# Patient Record
Sex: MALE | Race: WHITE | Employment: UNEMPLOYED | URBAN - METROPOLITAN AREA
[De-identification: names, ages, dates, MRNs, and addresses within clinical notes are randomized per-mention and may not be internally consistent; named-entity substitution may affect disease eponyms.]

---

## 2019-02-20 ENCOUNTER — HOSPITAL ENCOUNTER (EMERGENCY)
Age: 39
Discharge: HOME | End: 2019-02-20
Attending: EMERGENCY MEDICINE
Payer: MEDICAID

## 2019-02-20 VITALS
HEART RATE: 90 BPM | HEIGHT: 69 IN | WEIGHT: 190 LBS | BODY MASS INDEX: 28.14 KG/M2 | SYSTOLIC BLOOD PRESSURE: 130 MMHG | TEMPERATURE: 98 F | RESPIRATION RATE: 18 BRPM | OXYGEN SATURATION: 98 % | DIASTOLIC BLOOD PRESSURE: 89 MMHG

## 2019-02-20 DIAGNOSIS — F10.10 ALCOHOL ABUSE: Primary | ICD-10-CM

## 2019-02-20 PROCEDURE — 99282 EMERGENCY DEPT VISIT SF MDM: CPT

## 2019-02-20 RX ORDER — CHLORDIAZEPOXIDE HYDROCHLORIDE 5 MG/1
CAPSULE, GELATIN COATED ORAL
Qty: 12 CAP | Refills: 0 | OUTPATIENT
Start: 2019-02-20 | End: 2022-08-21

## 2019-02-20 NOTE — ED PROVIDER NOTES
'stopped drinking last night/ been drinking a 'while'; last time I stopped suddenly I started having seizures so my counselor brought me here to get treated'    pt denies HA, vison changes, diff swallowing, CP, SOB, Abd pain, F/Ch, N/V, D/Cons or other current systemic complaints    Pt told' if drinks alcohol on librium it could kill him'/ he has repeated this to me in front of counselor/ agrees w/ plans; The history is provided by the patient and a friend. Headache           No past medical history on file. No past surgical history on file. No family history on file. Social History     Socioeconomic History    Marital status: Not on file     Spouse name: Not on file    Number of children: Not on file    Years of education: Not on file    Highest education level: Not on file   Social Needs    Financial resource strain: Not on file    Food insecurity - worry: Not on file    Food insecurity - inability: Not on file    Transportation needs - medical: Not on file   [employer] needs - non-medical: Not on file   Occupational History    Not on file   Tobacco Use    Smoking status: Not on file   Substance and Sexual Activity    Alcohol use: Not on file    Drug use: Not on file    Sexual activity: Not on file   Other Topics Concern    Not on file   Social History Narrative    Not on file         ALLERGIES: Patient has no allergy information on record. Review of Systems   Neurological: Positive for headaches. All other systems reviewed and are negative. There were no vitals filed for this visit. Physical Exam   Constitutional: He is oriented to person, place, and time. He appears well-developed and well-nourished. No distress. NAD, AxOx4, speaking in complete sentences  gcs = 15    VSS   HENT:   Head: Normocephalic and atraumatic. Mouth/Throat: Oropharynx is clear and moist. No oropharyngeal exudate.    Cn intact       Eyes: Conjunctivae and EOM are normal. Pupils are equal, round, and reactive to light. Right eye exhibits no discharge. Left eye exhibits no discharge. Neck: Normal range of motion. Neck supple. Cardiovascular: Normal rate, regular rhythm, normal heart sounds and intact distal pulses. Exam reveals no gallop and no friction rub. No murmur heard. Pulmonary/Chest: Effort normal and breath sounds normal. No respiratory distress. He has no wheezes. He has no rales. He exhibits no tenderness. Abdominal: Soft. Bowel sounds are normal. He exhibits no distension and no mass. There is no tenderness. There is no rebound and no guarding. nttp     Genitourinary:   Genitourinary Comments: Pt denies urinary/ Testicular/ scrotal or penile  complaints   Musculoskeletal: Normal range of motion. He exhibits no edema, tenderness or deformity. Lymphadenopathy:     He has no cervical adenopathy. Neurological: He is alert and oriented to person, place, and time. He displays normal reflexes. No cranial nerve deficit or sensory deficit. He exhibits normal muscle tone. Coordination normal.   pt has motor/ CV/ Sensation grossly intact to all extremities, R = L in strength;   Skin: Skin is warm and dry. Capillary refill takes less than 2 seconds. No rash noted. No erythema. Psychiatric: He has a normal mood and affect. Denies SI/ HI   Nursing note and vitals reviewed. MDM       Procedures    6:13 PM Pt given LIBRIUM TAPER/ headed to rehab;   Scott Large  results have been reviewed with him. He has been counseled regarding his diagnosis. He verbally conveys understanding and agreement of the signs, symptoms, diagnosis, treatment and prognosis and additionally agrees to Call/ Arrange follow up as recommended with Dr. None in 24 - 48 hours. He also agrees with the care-plan and conveys that all of his questions have been answered.   I have also put together some discharge instructions for him that include: 1) educational information regarding their diagnosis, 2) how to care for their diagnosis at home, as well a 3) list of reasons why they would want to return to the ED prior to their follow-up appointment, should their condition change or for concerns.

## 2019-02-20 NOTE — ED TRIAGE NOTES
TRIAGE NOTE: Patient arrived from home with c/o \"medical clearance\" Patient is currently in a \"mental health\" program. Patient is a every day drinker. Last drink was yesterday midnight of 2L of wine.  Hx of dennys malone detoxing

## 2019-02-20 NOTE — DISCHARGE INSTRUCTIONS
Patient Education        Learning About Alcohol Misuse  What is alcohol misuse? Alcohol misuse means drinking so much that it causes problems for you or others. Early problems with alcohol can start at home. You may argue with loved ones about how much you're drinking. Your job may be affected because of drinking. You may drink when it's dangerous or illegal, such as when you drive. Drinking too much for a long time can lead to health conditions like high blood pressure and liver problems. What are the symptoms? Symptoms of alcohol misuse may include:  · Drinking much more than you planned. · Drinking even though it's causing problems for you or others. · Putting yourself in situations where you might get hurt. · Wanting to cut down or stop drinking, but not being able to. · Feeling guilty about how much you're drinking. How is alcohol misuse treated? Getting help for problems with alcohol is up to you. But you don't have to do it alone. There are many people and kinds of treatments to help with alcohol problems. Talking to your doctor is the first step. When you get a doctor's help, treatment for alcohol problems can be safer and quicker. Treatment options can include:  · Treatment programs. Examples are group therapy, one or more types of counseling, and alcohol education. · Medicines. A doctor or counselor can help you know what kinds of medicines might help with cravings. · Free social support groups. These groups include AA (Alcoholics Anonymous) and SMART (Self-Management and Recovery Training). Your doctor can help you decide which type of program is best for you. Follow-up care is a key part of your treatment and safety. Be sure to make and go to all appointments, and call your doctor if you are having problems. It's also a good idea to know your test results and keep a list of the medicines you take. Where can you learn more? Go to http://lauren-kristan.info/.   Enter H758 in the search box to learn more about \"Learning About Alcohol Misuse. \"  Current as of: May 7, 2018  Content Version: 11.9  © 7191-7585 [employer]. Care instructions adapted under license by [employer] (which disclaims liability or warranty for this information). If you have questions about a medical condition or this instruction, always ask your healthcare professional. [place] [address] any warranty or liability for your use of this information.

## 2021-05-20 ENCOUNTER — OFFICE VISIT (OUTPATIENT)
Dept: INTERNAL MEDICINE CLINIC | Age: 41
End: 2021-05-20
Payer: MEDICAID

## 2021-05-20 VITALS
DIASTOLIC BLOOD PRESSURE: 81 MMHG | HEART RATE: 89 BPM | WEIGHT: 201.9 LBS | BODY MASS INDEX: 29.9 KG/M2 | TEMPERATURE: 98.1 F | OXYGEN SATURATION: 97 % | SYSTOLIC BLOOD PRESSURE: 121 MMHG | HEIGHT: 69 IN | RESPIRATION RATE: 16 BRPM

## 2021-05-20 DIAGNOSIS — E66.3 OVERWEIGHT (BMI 25.0-29.9): ICD-10-CM

## 2021-05-20 DIAGNOSIS — F19.20 DRUG ABUSE AND DEPENDENCE (HCC): ICD-10-CM

## 2021-05-20 DIAGNOSIS — Z00.00 PHYSICAL EXAM: Primary | ICD-10-CM

## 2021-05-20 DIAGNOSIS — B17.10 ACUTE HEPATITIS C VIRUS INFECTION WITHOUT HEPATIC COMA: ICD-10-CM

## 2021-05-20 DIAGNOSIS — Z72.0 TOBACCO ABUSE: ICD-10-CM

## 2021-05-20 PROCEDURE — 99385 PREV VISIT NEW AGE 18-39: CPT | Performed by: INTERNAL MEDICINE

## 2021-05-20 PROCEDURE — 99214 OFFICE O/P EST MOD 30 MIN: CPT | Performed by: INTERNAL MEDICINE

## 2021-05-20 RX ORDER — BUPRENORPHINE HYDROCHLORIDE, NALOXONE HYDROCHLORIDE 8; 2 MG/1; MG/1
FILM, SOLUBLE BUCCAL; SUBLINGUAL
COMMUNITY
Start: 2021-05-07

## 2021-05-20 NOTE — PROGRESS NOTES
Chief Complaint   Patient presents with    New Patient     history of hepatitis C, IV drug use (clean since 2017)         1. Have you been to the ER, urgent care clinic since your last visit? Hospitalized since your last visit? No    2. Have you seen or consulted any other health care providers outside of the [address] [place] [address] since your last visit? Include any pap smears or colon screening.  No

## 2021-05-20 NOTE — PROGRESS NOTES
[facility]  [place] [address]  [address] [place] [address]  Phone:  [phone]  Fax: [phone]       Chief Complaint   Patient presents with    New Patient     history of hepatitis C, IV drug use (clean since 2017)   . SUBJECTIVE:    Tutu Albarran is a 47 y.o. male comes in for physical examination. He has a history of criminal drug abuse. He is now on Suboxone. He was told that he had a hepatitis C infection and wishes to follow up on this. He has no other complaints. He is accompanied by a . Current Outpatient Medications   Medication Sig Dispense Refill    Suboxone 8-2 mg film sublingaul film       chlordiazePOXIDE (LIBRIUM) 5 mg capsule Take 1 tab PO TID x 2 days then  Take 1 tab PO BID x 2 days then  Take 1 tab PO qday x 2 days then stop (Patient not taking: Reported on 5/20/2021) 12 Cap 0     Past Medical History:   Diagnosis Date    Hepatitis C      History reviewed. No pertinent surgical history.   No Known Allergies      REVIEW OF SYSTEMS:  General: negative for - chills or fever  ENT: negative for - headaches, nasal congestion or tinnitus  Respiratory: negative for - cough, hemoptysis, shortness of breath or wheezing  Cardiovascular : negative for - chest pain, edema, palpitations or shortness of breath  Gastrointestinal: negative for - abdominal pain, blood in stools, heartburn or nausea/vomiting  Genito-Urinary: no dysuria, trouble voiding, or hematuria  Musculoskeletal: negative for - gait disturbance, joint pain, joint stiffness or joint swelling  Neurological: no TIA or stroke symptoms  Hematologic: no bruises, no bleeding, no swollen glands  Integument: no lumps, mole changes, nail changes or rash  Endocrine: no malaise/lethargy or unexpected weight changes      Social History     Socioeconomic History    Marital status: SINGLE     Spouse name: Not on file    Number of children: Not on file    Years of education: Not on file    Highest education level: Not on file   Tobacco Use    Smoking status: Current Every Day Smoker    Smokeless tobacco: Never Used   Vaping Use    Vaping Use: Never used   Substance and Sexual Activity    Alcohol use: Yes    Drug use: Not Currently     Types: IV    Sexual activity: Yes     Partners: Female     Birth control/protection: Condom     Social Determinants of Health     Financial Resource Strain:     Difficulty of Paying Living Expenses:    Food Insecurity:     Worried About Running Out of Food in the Last Year:     [address] in the Last Year:    Transportation Needs:     Lack of Transportation (Medical):  Lack of Transportation (Non-Medical):    Physical Activity:     Days of Exercise per Week:     Minutes of Exercise per Session:    Stress:     Feeling of Stress :    Social Connections:     Frequency of Communication with Friends and Family:     Frequency of Social Gatherings with Friends and Family:     Attends L[employer] Services:     Active Member of Clubs or Organizations:     Attends Club or Organization Meetings:     Marital Status:      History reviewed. No pertinent family history. OBJECTIVE:    Visit Vitals  /81   Pulse 89   Temp 98.1 °F (36.7 °C) (Oral)   Resp 16   Ht 5' 9\" (1.753 m)   Wt 201 lb 14.4 oz (91.6 kg)   SpO2 97%   BMI 29.82 kg/m²     CONSTITUTIONAL: well , well nourished, appears age appropriate  EYES: perrla, eom intact  ENMT:moist mucous membranes, pharynx clear  NECK: supple. Thyroid normal  RESPIRATORY: Chest: clear to ascultation and percussion   CARDIOVASCULAR: Heart: regular rate and rhythm  GASTROINTESTINAL: Abdomen: soft, bowel sounds active  HEMATOLOGIC: no pathological lymph nodes palpated  MUSCULOSKELETAL: Extremities: no edema, pulse 1+   INTEGUMENT: No unusual rashes or suspicious skin lesions noted. Nails appear normal.  NEUROLOGIC: non-focal exam   MENTAL STATUS: alert and oriented, appropriate affect      ASSESSMENT:  1. Physical exam    2. Acute hepatitis C virus infection without hepatic coma    3. Overweight (BMI 25.0-29.9)    4. Tobacco abuse    5. Drug abuse and dependence ([facility] [address]        PLAN:  1. The patient has a history of femoral drug abuse. He was told that he had hepatitis C and this will be confirmed and he will subsequently be referred to a hepatologist.  2. He needs to discontinue cigarette smoking, but this is pretty much low in the list given the prior use of his drug abuse and hepatitis C.  3. He is currently in Suboxone therapy. .  Orders Placed This Encounter    HEPATITIS PANEL, ACUTE    HIV 1/2 AG/AB, 4TH GENERATION,W RFLX CONFIRM    RPR    CBC WITH AUTOMATED DIFF    LIPID PANEL    METABOLIC PANEL, COMPREHENSIVE    URINALYSIS W/ RFLX MICROSCOPIC    Suboxone 8-2 mg film sublingaul film         Follow-up and Dispositions    · Return if symptoms worsen or fail to improve.            Evan Smalls MD

## 2021-05-21 LAB
ALBUMIN SERPL-MCNC: 3.9 G/DL (ref 3.5–5)
ALBUMIN/GLOB SERPL: 1.1 {RATIO} (ref 1.1–2.2)
ALP SERPL-CCNC: 109 U/L (ref 45–117)
ALT SERPL-CCNC: 370 U/L (ref 12–78)
ANION GAP SERPL CALC-SCNC: 8 MMOL/L (ref 5–15)
APPEARANCE UR: CLEAR
AST SERPL-CCNC: 275 U/L (ref 15–37)
BASOPHILS # BLD: 0.1 K/UL (ref 0–0.1)
BASOPHILS NFR BLD: 1 % (ref 0–1)
BILIRUB SERPL-MCNC: 0.7 MG/DL (ref 0.2–1)
BILIRUB UR QL: NEGATIVE
BUN SERPL-MCNC: 9 MG/DL (ref 6–20)
BUN/CREAT SERPL: 13 (ref 12–20)
CALCIUM SERPL-MCNC: 8.9 MG/DL (ref 8.5–10.1)
CHLORIDE SERPL-SCNC: 105 MMOL/L (ref 97–108)
CHOLEST SERPL-MCNC: 162 MG/DL
CO2 SERPL-SCNC: 27 MMOL/L (ref 21–32)
COLOR UR: ABNORMAL
CREAT SERPL-MCNC: 0.7 MG/DL (ref 0.7–1.3)
DIFFERENTIAL METHOD BLD: ABNORMAL
EOSINOPHIL # BLD: 0.2 K/UL (ref 0–0.4)
EOSINOPHIL NFR BLD: 4 % (ref 0–7)
ERYTHROCYTE [DISTWIDTH] IN BLOOD BY AUTOMATED COUNT: 12.7 % (ref 11.5–14.5)
GLOBULIN SER CALC-MCNC: 3.4 G/DL (ref 2–4)
GLUCOSE SERPL-MCNC: 91 MG/DL (ref 65–100)
GLUCOSE UR STRIP.AUTO-MCNC: NEGATIVE MG/DL
HAV IGM SER QL: NONREACTIVE
HBV CORE IGM SER QL: NONREACTIVE
HBV SURFACE AG SER QL: 0.4 INDEX
HBV SURFACE AG SER QL: NEGATIVE
HCT VFR BLD AUTO: 50.4 % (ref 36.6–50.3)
HCV AB SER IA-ACNC: >11 INDEX
HCV AB SERPL QL IA: REACTIVE
HCV COMMENT,HCGAC: ABNORMAL
HDLC SERPL-MCNC: 43 MG/DL
HDLC SERPL: 3.8 {RATIO} (ref 0–5)
HGB BLD-MCNC: 16.5 G/DL (ref 12.1–17)
HGB UR QL STRIP: NEGATIVE
HIV 1+2 AB+HIV1 P24 AG SERPL QL IA: NONREACTIVE
HIV12 RESULT COMMENT, HHIVC: NORMAL
IMM GRANULOCYTES # BLD AUTO: 0 K/UL (ref 0–0.04)
IMM GRANULOCYTES NFR BLD AUTO: 0 % (ref 0–0.5)
KETONES UR QL STRIP.AUTO: NEGATIVE MG/DL
LDLC SERPL CALC-MCNC: 92 MG/DL (ref 0–100)
LEUKOCYTE ESTERASE UR QL STRIP.AUTO: NEGATIVE
LYMPHOCYTES # BLD: 1.4 K/UL (ref 0.8–3.5)
LYMPHOCYTES NFR BLD: 27 % (ref 12–49)
MCH RBC QN AUTO: 32.4 PG (ref 26–34)
MCHC RBC AUTO-ENTMCNC: 32.7 G/DL (ref 30–36.5)
MCV RBC AUTO: 98.8 FL (ref 80–99)
MONOCYTES # BLD: 0.6 K/UL (ref 0–1)
MONOCYTES NFR BLD: 11 % (ref 5–13)
NEUTS SEG # BLD: 3 K/UL (ref 1.8–8)
NEUTS SEG NFR BLD: 57 % (ref 32–75)
NITRITE UR QL STRIP.AUTO: NEGATIVE
NRBC # BLD: 0 K/UL (ref 0–0.01)
NRBC BLD-RTO: 0 PER 100 WBC
PH UR STRIP: >8.5 [PH] (ref 5–8)
PLATELET # BLD AUTO: 161 K/UL (ref 150–400)
PMV BLD AUTO: 10.3 FL (ref 8.9–12.9)
POTASSIUM SERPL-SCNC: 4.4 MMOL/L (ref 3.5–5.1)
PROT SERPL-MCNC: 7.3 G/DL (ref 6.4–8.2)
PROT UR STRIP-MCNC: NEGATIVE MG/DL
RBC # BLD AUTO: 5.1 M/UL (ref 4.1–5.7)
RPR SER QL: NONREACTIVE
SODIUM SERPL-SCNC: 140 MMOL/L (ref 136–145)
SP GR UR REFRACTOMETRY: 1.01 (ref 1–1.03)
SP1: ABNORMAL
SP2: ABNORMAL
SP3: ABNORMAL
TRIGL SERPL-MCNC: 135 MG/DL (ref ?–150)
UROBILINOGEN UR QL STRIP.AUTO: 0.2 EU/DL (ref 0.2–1)
VLDLC SERPL CALC-MCNC: 27 MG/DL
WBC # BLD AUTO: 5.2 K/UL (ref 4.1–11.1)

## 2021-05-23 DIAGNOSIS — B17.10 ACUTE HEPATITIS C VIRUS INFECTION WITHOUT HEPATIC COMA: Primary | ICD-10-CM

## 2021-05-23 PROBLEM — Z72.0 TOBACCO ABUSE: Status: ACTIVE | Noted: 2021-05-23

## 2021-05-23 PROBLEM — F19.20 DRUG ABUSE AND DEPENDENCE (HCC): Status: ACTIVE | Noted: 2021-05-23

## 2021-05-23 PROBLEM — E66.3 OVERWEIGHT (BMI 25.0-29.9): Status: ACTIVE | Noted: 2021-05-23

## 2021-06-15 PROBLEM — B18.2 CHRONIC HEPATITIS C WITHOUT HEPATIC COMA (HCC): Status: ACTIVE | Noted: 2021-06-15

## 2022-01-03 ENCOUNTER — TELEPHONE (OUTPATIENT)
Dept: INTERNAL MEDICINE CLINIC | Age: 42
End: 2022-01-03

## 2022-01-03 NOTE — TELEPHONE ENCOUNTER
----- Message from [employer] sent at 12/30/2021  3:10 PM EST -----  Subject: Message to Provider    QUESTIONS  Information for Provider? MAHSA Dominguez Hardy with [address], is   calling to check on the status of paperwork for the pt that was supposed   to be sent over to them a few days ago. Third party states that they   haven't received any paperwork.   ---------------------------------------------------------------------------  --------------  Catalina GOETZ  What is the best way for the office to contact you? OK to leave message on   voicemail  Preferred Call Back Phone Number? [phone]  ---------------------------------------------------------------------------  --------------  SCRIPT ANSWERS  Relationship to Patient? Third Party  Representative Name?  Martha Whipple

## 2022-01-30 ENCOUNTER — HOSPITAL ENCOUNTER (EMERGENCY)
Age: 42
Discharge: HOME | End: 2022-01-30
Attending: EMERGENCY MEDICINE
Payer: MEDICAID

## 2022-01-30 VITALS
SYSTOLIC BLOOD PRESSURE: 158 MMHG | TEMPERATURE: 97.9 F | WEIGHT: 201 LBS | DIASTOLIC BLOOD PRESSURE: 100 MMHG | HEIGHT: 69 IN | HEART RATE: 108 BPM | RESPIRATION RATE: 18 BRPM | BODY MASS INDEX: 29.77 KG/M2 | OXYGEN SATURATION: 98 %

## 2022-01-30 DIAGNOSIS — H61.22 IMPACTED CERUMEN OF LEFT EAR: Primary | ICD-10-CM

## 2022-01-30 DIAGNOSIS — F10.10 ALCOHOL ABUSE: ICD-10-CM

## 2022-01-30 PROCEDURE — 99284 EMERGENCY DEPT VISIT MOD MDM: CPT

## 2022-01-30 NOTE — Clinical Note
[address]  [facility] EMERGENCY DEPT  [address] [facility] [address] [phone]    Work/School Note    Date: 1/30/2022    To Whom It May concern:    Maddi Gipson was seen and treated today in the emergency room by the following provider(s):  Attending Provider: Ekta Chapin MD.      Maddi Gipson is excused from work/school on 01/30/22 and 01/31/22. He is medically clear to return to work/school on 2/1/2022.        Sincerely,          Carlitos Macario

## 2022-01-30 NOTE — ED TRIAGE NOTES
Pt presents with multiple complaints. Pt reports \"water\" stuck in left ear x 1 week. Pt als reports bilateral lower eye swelling.  Pt reports he vomited this AM. Pt reports his job is requesting a  COVID test

## 2022-01-30 NOTE — Clinical Note
[facility] EMERGENCY DEPT  [address] [facility] [address] [phone]    Work/School Note    Date: 1/30/2022    To Whom It May concern:    Armand Kelley was seen and treated today in the emergency room by the following provider(s):  Attending Provider: Ella Rinaldi MD.      Armand Kelley is excused from work/school on 01/30/22 and 01/31/22. He is medically clear to return to work/school on 2/1/2022.        Sincerely,          Tobias Kessler

## 2022-01-30 NOTE — Clinical Note
[facility] EMERGENCY DEPT  [address] [facility] [address] [phone]    Work/School Note    Date: 1/30/2022    To Whom It May concern:    Sanders Koyanagi was seen and treated today in the emergency room by the following provider(s):  Attending Provider: Abdulkadir Christensen MD.      Sanders Koyanagi is excused from work/school on 01/30/22 and 01/31/22. He is medically clear to return to work/school on 2/1/2022.        Sincerely,          Mallika Pan RN

## 2022-01-30 NOTE — ED NOTES
Pt presents to ED ambulatory complaining of feeling of water in his left ear. Pt also states he has a bulb syringe at home with peroxide and will clean his ear out at home. Pt also states he just had blood work done and does not wish to have blood drawn. Pt is alert and oriented x 4, RR even and unlabored, skin is warm and dry. Assessment completed and pt updated on plan of care. Call bell in reach. Emergency Department Nursing Plan of Care       The Nursing Plan of Care is developed from the Nursing assessment and Emergency Department Attending provider initial evaluation. The plan of care may be reviewed in the ED Provider note.     The Plan of Care was developed with the following considerations:   Patient / Family readiness to learn indicated by:verbalized understanding  Persons(s) to be included in education: patient  Barriers to Learning/Limitations:No    Signed     Claudene Libman, RN    1/30/2022   3:01 PM

## 2022-01-30 NOTE — ED PROVIDER NOTES
EMERGENCY DEPARTMENT HISTORY AND PHYSICAL EXAM      Date: 1/30/2022  Patient Name: Derrek Brown    History of Presenting Illness     Chief Complaint   Patient presents with    Ear Pain    Vomiting       History Provided By: Patient    HPI: Derrek Brown, 46 y.o. male with PMHx of Hep C and polysubstance abuse presents to the ED with cc of several complaints. Pt states he has the sensation of water in his L ear which is causing discomfort. Denies ear drainage or tinnitus. C/o n/v x1 this morning while brushing his teeth, no episodes since. Patient is concerned with periorbital swelling that onset this morning, no known contact with food or drug allergies. States he drinks etoh daily, last consumption was yesterday night. Denies SOB or CP. There are no other complaints, changes, or physical findings at this time. PCP: Rasta Raman MD    No current facility-administered medications on file prior to encounter. Current Outpatient Medications on File Prior to Encounter   Medication Sig Dispense Refill    Suboxone 8-2 mg film sublingaul film       chlordiazePOXIDE (LIBRIUM) 5 mg capsule Take 1 tab PO TID x 2 days then  Take 1 tab PO BID x 2 days then  Take 1 tab PO qday x 2 days then stop (Patient not taking: Reported on 5/20/2021) 12 Cap 0       Past History     Past Medical History:  No past medical history on file. Past Surgical History:  No past surgical history on file. Family History:  No family history on file. Social History:  Social History     Tobacco Use    Smoking status: Current Every Day Smoker    Smokeless tobacco: Never Used   Vaping Use    Vaping Use: Never used   Substance Use Topics    Alcohol use: Yes    Drug use: Not Currently     Types: IV       Allergies:  No Known Allergies      Review of Systems   Review of Systems   Constitutional: Negative for chills, fatigue and fever. HENT: Positive for ear pain and sinus pressure. Negative for sore throat.     Eyes: Negative. Respiratory: Negative for cough and shortness of breath. Cardiovascular: Negative for chest pain and palpitations. Gastrointestinal: Positive for nausea and vomiting. Negative for abdominal pain. Genitourinary: Negative for dysuria. Musculoskeletal: Negative. Skin: Negative. Neurological: Negative for dizziness, weakness, light-headedness and headaches. Psychiatric/Behavioral: Negative. All other systems reviewed and are negative. Physical Exam   Physical Exam  Vitals and nursing note reviewed. Exam conducted with a chaperone present. Constitutional:       Appearance: Normal appearance. He is not toxic-appearing. HENT:      Head: Normocephalic and atraumatic. Left Ear: No swelling or tenderness. Ears:      Comments: Cerumen in the left external auditory canal.     Mouth/Throat:      Mouth: Mucous membranes are moist.   Eyes:      Extraocular Movements: Extraocular movements intact. Pupils: Pupils are equal, round, and reactive to light. Cardiovascular:      Rate and Rhythm: Normal rate and regular rhythm. Pulmonary:      Effort: Pulmonary effort is normal. No respiratory distress. Breath sounds: Normal breath sounds. No wheezing, rhonchi or rales. Abdominal:      General: Abdomen is flat. There is no distension. Palpations: Abdomen is soft. Tenderness: There is no abdominal tenderness. Hernia: No hernia is present. Musculoskeletal:         General: No swelling or tenderness. Normal range of motion. Skin:     General: Skin is warm and dry. Neurological:      General: No focal deficit present. Mental Status: He is alert and oriented to person, place, and time. Cranial Nerves: No cranial nerve deficit. Sensory: No sensory deficit.    Psychiatric:         Mood and Affect: Mood normal.         Behavior: Behavior normal.         Diagnostic Study Results     Labs -   No results found for this or any previous visit (from the past 12 hour(s)). Radiologic Studies -   No orders to display     CT Results  (Last 48 hours)    None        CXR Results  (Last 48 hours)    None          Medical Decision Making   I am the first provider for this patient. I reviewed the vital signs, available nursing notes, past medical history, past surgical history, family history and social history. Vital Signs-Reviewed the patient's vital signs. Patient Vitals for the past 12 hrs:   Temp Pulse Resp BP SpO2   01/30/22 1442 97.9 °F (36.6 °C) (!) 122 18 (!) 162/98 97 %           Records Reviewed: Nursing Notes    Provider Notes (Medical Decision Making):   Substance abuse, cerumen impaction    ED Course:   Initial assessment performed. The patients presenting problems have been discussed, and they are in agreement with the care plan formulated and outlined with them. I have encouraged them to ask questions as they arise throughout their visit. Critical Care Time:   none      Disposition:  DISCHARGE  3:00 PM  The patient has been re-evaluated and is ready for discharge. Reviewed available results with patient. Counseled pt on diagnosis and care plan. Pt has expressed understanding, and all questions have been answered. Pt agrees with plan and agrees to follow up as recommended, or return to the ED if their symptoms worsen. Discharge instructions have been provided and explained to the pt, along with reasons to return to the ED. DISCHARGE PLAN:  1. Current Discharge Medication List        2. Follow-up Information    None       3. Return to ED if worse     Diagnosis     Clinical Impression: No diagnosis found. Attestations: This note is prepared by Adalberto Mcgowan, acting as Scribe for Dennis Martinez MD.     Dennis Martinez MD. The scribe's documentation has been prepared under my direction and personally reviewed by me in its entirety.  I confirm that the note above accurately reflects all work, treatment, procedures and medical decision making performed by me.

## 2022-03-09 ENCOUNTER — OFFICE VISIT (OUTPATIENT)
Dept: INTERNAL MEDICINE CLINIC | Age: 42
End: 2022-03-09
Payer: MEDICAID

## 2022-03-09 VITALS
SYSTOLIC BLOOD PRESSURE: 137 MMHG | HEIGHT: 69 IN | TEMPERATURE: 98.5 F | OXYGEN SATURATION: 94 % | WEIGHT: 202.2 LBS | BODY MASS INDEX: 29.95 KG/M2 | RESPIRATION RATE: 18 BRPM | HEART RATE: 76 BPM | DIASTOLIC BLOOD PRESSURE: 89 MMHG

## 2022-03-09 DIAGNOSIS — B18.2 CHRONIC HEPATITIS C WITHOUT HEPATIC COMA (HCC): ICD-10-CM

## 2022-03-09 DIAGNOSIS — E66.3 OVERWEIGHT (BMI 25.0-29.9): ICD-10-CM

## 2022-03-09 DIAGNOSIS — G47.00 INSOMNIA, UNSPECIFIED TYPE: Primary | ICD-10-CM

## 2022-03-09 DIAGNOSIS — R03.0 WHITE COAT SYNDROME WITH HIGH BLOOD PRESSURE WITHOUT HYPERTENSION: ICD-10-CM

## 2022-03-09 DIAGNOSIS — F19.20 DRUG ABUSE AND DEPENDENCE (HCC): ICD-10-CM

## 2022-03-09 PROCEDURE — 99214 OFFICE O/P EST MOD 30 MIN: CPT | Performed by: INTERNAL MEDICINE

## 2022-03-09 RX ORDER — ZOLPIDEM TARTRATE 5 MG/1
5 TABLET ORAL
Qty: 10 TABLET | Refills: 0 | Status: SHIPPED | OUTPATIENT
Start: 2022-03-09

## 2022-03-09 RX ORDER — VELPATASVIR AND SOFOSBUVIR 100; 400 MG/1; MG/1
TABLET, FILM COATED ORAL
COMMUNITY
Start: 2022-01-26

## 2022-03-09 NOTE — PROGRESS NOTES
Chief Complaint   Patient presents with    Insomnia    Hypertension    Leg Pain     bilateral in thigh region     1. Have you been to the ER, urgent care clinic since your last visit? Hospitalized since your last visit? No    2. Have you seen or consulted any other health care providers outside of the [address] [place] [address] since your last visit? Include any pap smears or colon screening.  No  Visit Vitals  /89   Pulse 76   Temp 98.5 °F (36.9 °C) (Oral)   Resp 18   Ht 5' 9\" (1.753 m)   Wt 202 lb 3.2 oz (91.7 kg)   SpO2 94%   BMI 29.86 kg/m²

## 2022-03-13 PROBLEM — R03.0 WHITE COAT SYNDROME WITH HIGH BLOOD PRESSURE WITHOUT HYPERTENSION: Status: ACTIVE | Noted: 2022-03-13

## 2022-03-13 PROBLEM — G47.00 INSOMNIA: Status: ACTIVE | Noted: 2022-03-13

## 2022-03-13 NOTE — PROGRESS NOTES
"Chief Complaint   Patient presents with     Recheck Medication     ADHD       Initial /66 (BP Location: Left arm, Patient Position: Sitting, Cuff Size: Adult Regular)  Pulse 94  Temp 98.3  F (36.8  C) (Tympanic)  Ht 5' 4.75\" (1.645 m)  Wt 137 lb (62.1 kg)  BMI 22.97 kg/m2 Estimated body mass index is 22.97 kg/(m^2) as calculated from the following:    Height as of this encounter: 5' 4.75\" (1.645 m).    Weight as of this encounter: 137 lb (62.1 kg).  Medication Reconciliation: complete     REEMA HOROWITZ      " [address] [facility]  [place] [address]  [address] [address] [facility] [address] [address]  Phone:  [phone]  Fax: [phone]       Chief Complaint   Patient presents with    Insomnia    Hypertension    Leg Pain     bilateral in thigh region   . SUBJECTIVE:    Dre Ng is a 46 y.o. male comes in for return visit complaining of insomnia. This is a chronic ongoing problem. He has taken multiple preparations in the past and for most of them he has build up tolerance. He feels that if he an get sleep he will get much more functional.    He does have a history of sociopathic behavior manifesting itself as drug abuse. He is currently taking Suboxone. He moved from [place] approximately a year ago. His blood pressure was slightly elevated on one occasion. He is gainfully employed currently. Finally, he is taking medication for his hepatitis C. Current Outpatient Medications   Medication Sig Dispense Refill    sofosbuvir-velpatasvir (EPCLUSA) 400-100 mg tablet       zolpidem (AMBIEN) 5 mg tablet Take 1 Tablet by mouth nightly as needed for Sleep. Max Daily Amount: 5 mg. 10 Tablet 0    Suboxone 8-2 mg film sublingaul film       chlordiazePOXIDE (LIBRIUM) 5 mg capsule Take 1 tab PO TID x 2 days then  Take 1 tab PO BID x 2 days then  Take 1 tab PO qday x 2 days then stop (Patient not taking: Reported on 5/20/2021) 12 Cap 0     History reviewed. No pertinent past medical history. History reviewed. No pertinent surgical history.   No Known Allergies      REVIEW OF SYSTEMS:  General: negative for - chills or fever  ENT: negative for - headaches, nasal congestion or tinnitus  Respiratory: negative for - cough, hemoptysis, shortness of breath or wheezing  Cardiovascular : negative for - chest pain, edema, palpitations or shortness of breath  Gastrointestinal: negative for - abdominal pain, blood in stools, heartburn or nausea/vomiting  Genito-Urinary: no dysuria, trouble voiding, or hematuria  Musculoskeletal: negative for - gait disturbance, joint pain, joint stiffness or joint swelling  Neurological: no TIA or stroke symptoms  Hematologic: no bruises, no bleeding, no swollen glands  Integument: no lumps, mole changes, nail changes or rash  Endocrine: no malaise/lethargy or unexpected weight changes      Social History     Socioeconomic History    Marital status: SINGLE   Tobacco Use    Smoking status: Current Every Day Smoker     Packs/day: 0.50    Smokeless tobacco: Never Used   Vaping Use    Vaping Use: Never used   Substance and Sexual Activity    Alcohol use: Yes     Comment: soc    Drug use: Not Currently     Types: IV    Sexual activity: Yes     Partners: Female     Birth control/protection: Condom     History reviewed. No pertinent family history. OBJECTIVE:    Visit Vitals  /89   Pulse 76   Temp 98.5 °F (36.9 °C) (Oral)   Resp 18   Ht 5' 9\" (1.753 m)   Wt 202 lb 3.2 oz (91.7 kg)   SpO2 94%   BMI 29.86 kg/m²     CONSTITUTIONAL: well , well nourished, appears age appropriate  EYES: perrla, eom intact  ENMT:moist mucous membranes, pharynx clear  NECK: supple. Thyroid normal  RESPIRATORY: Chest: clear to ascultation and percussion   CARDIOVASCULAR: Heart: regular rate and rhythm  GASTROINTESTINAL: Abdomen: soft, bowel sounds active  HEMATOLOGIC: no pathological lymph nodes palpated  MUSCULOSKELETAL: Extremities: no edema, pulse 1+   INTEGUMENT: No unusual rashes or suspicious skin lesions noted. Nails appear normal.  NEUROLOGIC: non-focal exam   MENTAL STATUS: alert and oriented, appropriate affect      ASSESSMENT:  1. Insomnia, unspecified type    2. Chronic hepatitis C without hepatic coma ([address]    3. Drug abuse and dependence ([facility] [address]    4. Overweight (BMI 25.0-29.9)    5. White coat syndrome with high blood pressure without hypertension        PLAN:  1. The patient has chronic insomnia. I am going to give him a short course of Ambien.   Unfortunately, there is not much that can be done for this individual because of the tolerance that he has to all the hypnotics for the most part. He has to follow with psychiatry for this. 2. He is to follow with his gastroenterologist regarding his hepatitis C.  3. He remains on the Suboxone for his drug abuse. 4. I suggest minimizing weight gain, which can be accomplished by eating meals, eliminating snacks, and avoiding the consumption of processed carbohydrates. Unfortunately, this is not a high priority for the patient as would be expected. 5. Blood pressure is entirely normal today. .  Orders Placed This Encounter    sofosbuvir-velpatasvir (EPCLUSA) 400-100 mg tablet    zolpidem (AMBIEN) 5 mg tablet         Follow-up and Dispositions    · Return if symptoms worsen or fail to improve.            Lenore Pitts MD

## 2022-07-15 ENCOUNTER — HOSPITAL ENCOUNTER (EMERGENCY)
Age: 42
Discharge: HOME | End: 2022-07-15
Attending: STUDENT IN AN ORGANIZED HEALTH CARE EDUCATION/TRAINING PROGRAM
Payer: MEDICAID

## 2022-07-15 VITALS
DIASTOLIC BLOOD PRESSURE: 79 MMHG | BODY MASS INDEX: 30.36 KG/M2 | SYSTOLIC BLOOD PRESSURE: 124 MMHG | HEIGHT: 69 IN | HEART RATE: 101 BPM | OXYGEN SATURATION: 96 % | TEMPERATURE: 98.1 F | RESPIRATION RATE: 18 BRPM | WEIGHT: 205 LBS

## 2022-07-15 DIAGNOSIS — M54.50 ACUTE BILATERAL LOW BACK PAIN WITHOUT SCIATICA: Primary | ICD-10-CM

## 2022-07-15 PROCEDURE — 99284 EMERGENCY DEPT VISIT MOD MDM: CPT

## 2022-07-15 PROCEDURE — 74011250636 HC RX REV CODE- 250/636: Performed by: STUDENT IN AN ORGANIZED HEALTH CARE EDUCATION/TRAINING PROGRAM

## 2022-07-15 PROCEDURE — 96372 THER/PROPH/DIAG INJ SC/IM: CPT

## 2022-07-15 PROCEDURE — 74011250637 HC RX REV CODE- 250/637: Performed by: STUDENT IN AN ORGANIZED HEALTH CARE EDUCATION/TRAINING PROGRAM

## 2022-07-15 RX ORDER — CYCLOBENZAPRINE HCL 10 MG
10 TABLET ORAL
Status: COMPLETED | OUTPATIENT
Start: 2022-07-15 | End: 2022-07-15

## 2022-07-15 RX ORDER — IBUPROFEN 200 MG
400 TABLET ORAL
Qty: 20 TABLET | Refills: 0 | Status: SHIPPED | OUTPATIENT
Start: 2022-07-15

## 2022-07-15 RX ORDER — KETOROLAC TROMETHAMINE 30 MG/ML
15 INJECTION, SOLUTION INTRAMUSCULAR; INTRAVENOUS
Status: COMPLETED | OUTPATIENT
Start: 2022-07-15 | End: 2022-07-15

## 2022-07-15 RX ORDER — CYCLOBENZAPRINE HCL 5 MG
5 TABLET ORAL
Qty: 8 TABLET | Refills: 0 | OUTPATIENT
Start: 2022-07-15 | End: 2022-08-21

## 2022-07-15 RX ADMIN — CYCLOBENZAPRINE 10 MG: 10 TABLET, FILM COATED ORAL at 18:16

## 2022-07-15 RX ADMIN — KETOROLAC TROMETHAMINE 15 MG: 30 INJECTION, SOLUTION INTRAMUSCULAR; INTRAVENOUS at 18:16

## 2022-07-15 NOTE — ED TRIAGE NOTES
CC of lower back pain times 3 days, pt has hx herniated disk L4 and L5. Pt endorses more pain with movement, pt denies injury or trauma.

## 2022-07-15 NOTE — ED PROVIDER NOTES
EMERGENCY DEPARTMENT HISTORY AND PHYSICAL EXAM      Date: 7/15/2022  Patient Name: Cara Avalos    History of Presenting Illness     Chief Complaint   Patient presents with    Back Pain         HPI: Cara Avalos, 38 y.o. male presents to the ED with cc of back pain. He reports low back pain for the past 3 days. This started after he was fixing a child's bike and then rode around on it scrunched up. He says that he has issues with herniated disc in his low back, and it usually flares up about every year. He describes it as pain in the low back that is worse with carrying objects. He denies any weakness or numbness in the lower extremities, no bowel or bladder incontinence, no fevers. No other falls or trauma. There are no other complaints, changes, or physical findings at this time. PCP: Mary Mcduffie MD    No current facility-administered medications on file prior to encounter. Current Outpatient Medications on File Prior to Encounter   Medication Sig Dispense Refill    sofosbuvir-velpatasvir (EPCLUSA) 400-100 mg tablet       zolpidem (AMBIEN) 5 mg tablet Take 1 Tablet by mouth nightly as needed for Sleep. Max Daily Amount: 5 mg. 10 Tablet 0    Suboxone 8-2 mg film sublingaul film       chlordiazePOXIDE (LIBRIUM) 5 mg capsule Take 1 tab PO TID x 2 days then  Take 1 tab PO BID x 2 days then  Take 1 tab PO qday x 2 days then stop (Patient not taking: Reported on 5/20/2021) 12 Cap 0       Past History     Past Medical History:  Psychiatric history    Past Surgical History:  History reviewed. No pertinent surgical history. Family History:  History reviewed. No pertinent family history.     Social History:  Social History     Tobacco Use    Smoking status: Current Every Day Smoker     Packs/day: 0.50    Smokeless tobacco: Current User   Vaping Use    Vaping Use: Never used   Substance Use Topics    Alcohol use: Yes     Comment: soc    Drug use: Yes     Types: IV, Marijuana Allergies:  No Known Allergies      Review of Systems   no fever  No ear pain  No eye pain  no shortness of breath  no chest pain  no abdominal pain  no dysuria  Reports low back pain    Physical Exam   Physical Exam  Constitutional:       General: He is not in acute distress. Appearance: He is not toxic-appearing. HENT:      Head: Normocephalic. Cardiovascular:      Rate and Rhythm: Normal rate and regular rhythm. Pulmonary:      Effort: Pulmonary effort is normal.      Breath sounds: Normal breath sounds. Abdominal:      Palpations: Abdomen is soft. Tenderness: There is no abdominal tenderness. Musculoskeletal:         General: No deformity. Cervical back: Neck supple. Comments: Tender to palpation throughout the lower lumbar spine, no step-offs   Skin:     General: Skin is warm. Neurological:      Mental Status: He is alert. Comments: Sensation to light touch intact over lower extremities bilaterally, 5 out of 5 strength with knee extension bilaterally. Psychiatric:         Mood and Affect: Mood normal.         Diagnostic Study Results     Labs -   No results found for this or any previous visit (from the past 24 hour(s)). Radiologic Studies -   No orders to display     CT Results  (Last 48 hours)    None        CXR Results  (Last 48 hours)    None            Medical Decision Making   I am the first provider for this patient. I reviewed the vital signs, available nursing notes, past medical history, past surgical history, family history and social history. Vital Signs-Reviewed the patient's vital signs. Patient Vitals for the past 24 hrs:   Temp Pulse Resp BP SpO2   07/15/22 1648 98.1 °F (36.7 °C) (!) 101 18 124/79 96 %         Provider Notes (Medical Decision Making):   74-year-old male presenting with low back pain.   Diffuse lumbar pain after bending over, worse with movement, consistent with likely muscle spasm or strain, differential includes herniated disc.  No falls or significant trauma, unlikely acute osseous injury. No red flag symptoms concerning for infectious cause of pain, no fevers. No weakness numbness or tingling, normal neuro exam the lower extremities, no bowel or bladder incontinence, unlikely cauda equina or acute neurologic compromise. ED Course:     Initial assessment performed. The patients presenting problems have been discussed, and they are in agreement with the care plan formulated and outlined with them. I have encouraged them to ask questions as they arise throughout their visit. Patient is given Flexeril and Toradol. Patient is counseled on supportive care and return precautions. Will return to the ED for any worsening pain, weakness or numbness, fevers, incontinence, or any new or worrisome symptoms. Will followup with primary care doctor within 3 days. Critical Care Time:         Disposition:  Home    PLAN:  1. Current Discharge Medication List        2.    Follow-up Information    None       Return to ED if worse     Diagnosis     Clinical Impression: Acute low back pain

## 2022-07-15 NOTE — Clinical Note
[facility] EMERGENCY DEPT  [address] [facility] [address] [phone]    Work/School Note    Date: 7/15/2022    To Whom It May concern:    Stephanie Ríos was seen and treated today in the emergency room by the following provider(s):  Attending Provider: Tiny Hassan MD.      Stephanie Ríos is excused from work/school on 7/15/2022 through 7/17/2022. He is medically clear to return to work/school on 7/18/2022.          Sincerely,          Tom Kumar MD

## 2022-07-19 ENCOUNTER — HOSPITAL ENCOUNTER (EMERGENCY)
Age: 42
Discharge: HOME | End: 2022-07-19
Attending: STUDENT IN AN ORGANIZED HEALTH CARE EDUCATION/TRAINING PROGRAM
Payer: MEDICAID

## 2022-07-19 VITALS
SYSTOLIC BLOOD PRESSURE: 118 MMHG | RESPIRATION RATE: 14 BRPM | TEMPERATURE: 98.6 F | OXYGEN SATURATION: 97 % | BODY MASS INDEX: 29.99 KG/M2 | HEIGHT: 69 IN | HEART RATE: 97 BPM | WEIGHT: 202.5 LBS | DIASTOLIC BLOOD PRESSURE: 78 MMHG

## 2022-07-19 DIAGNOSIS — K04.7 DENTAL INFECTION: Primary | ICD-10-CM

## 2022-07-19 DIAGNOSIS — K02.9 DENTAL DECAY: ICD-10-CM

## 2022-07-19 PROCEDURE — 99283 EMERGENCY DEPT VISIT LOW MDM: CPT

## 2022-07-19 PROCEDURE — 74011000250 HC RX REV CODE- 250: Performed by: PHYSICIAN ASSISTANT

## 2022-07-19 PROCEDURE — 74011250637 HC RX REV CODE- 250/637: Performed by: PHYSICIAN ASSISTANT

## 2022-07-19 RX ORDER — AMOXICILLIN AND CLAVULANATE POTASSIUM 875; 125 MG/1; MG/1
1 TABLET, FILM COATED ORAL
Status: COMPLETED | OUTPATIENT
Start: 2022-07-19 | End: 2022-07-19

## 2022-07-19 RX ORDER — AMOXICILLIN AND CLAVULANATE POTASSIUM 875; 125 MG/1; MG/1
1 TABLET, FILM COATED ORAL 2 TIMES DAILY
Qty: 13 TABLET | Refills: 0 | Status: SHIPPED | OUTPATIENT
Start: 2022-07-19 | End: 2022-07-26

## 2022-07-19 RX ADMIN — DIPHENHYDRAMINE HYDROCHLORIDE: 12.5 LIQUID ORAL at 16:51

## 2022-07-19 RX ADMIN — AMOXICILLIN AND CLAVULANATE POTASSIUM 1 TABLET: 875; 125 TABLET, FILM COATED ORAL at 16:51

## 2022-07-19 NOTE — Clinical Note
[address] EMERGENCY DEPT  [address] [facility] [address] [phone]    Work/School Note    Date: 7/19/2022    To Whom It May concern:    Cristal Pimentel was seen and treated today in the emergency room by the following provider(s):  Attending Provider: Raj Everett MD  Physician Assistant: KYM Almazan.      Crisatl Pimentel is excused from work/school on 7/19/2022 through 7/21/2022. He is medically clear to return to work/school on 7/22/2022.          Sincerely,          Stacie Mcnair, [address]

## 2022-07-19 NOTE — ED PROVIDER NOTES
EMERGENCY DEPARTMENT HISTORY AND PHYSICAL EXAM      Date: 7/19/2022  Patient Name: Casandra Schrader    History of Presenting Illness     Chief Complaint   Patient presents with    Dental Pain       History Provided By: Patient    HPI: Casandra Schrader, 38 y.o. smoking male with PMHx of polysubstance abuse, hepatitis C presents BIB self to the ED with cc of \"abscessed tooth. \"  The patient reports pain and swelling at the left lower molars, worsening over the last 3 days. Pain is described as sharp and constant worse with all movements and when eating. Patient is tolerating p.o. with pain. He states he has never seen a dentist before. Denies fever, purulent drainage in his mouth, difficulty swallowing. There are no other complaints, changes, or physical findings at this time. PCP: Joshua Seals MD    No current facility-administered medications on file prior to encounter. Current Outpatient Medications on File Prior to Encounter   Medication Sig Dispense Refill    ibuprofen (MOTRIN) 200 mg tablet Take 2 Tablets by mouth every six (6) hours as needed for Pain. 20 Tablet 0    cyclobenzaprine (FLEXERIL) 5 mg tablet Take 1 Tablet by mouth three (3) times daily as needed for Muscle Spasm(s). 8 Tablet 0    sofosbuvir-velpatasvir (EPCLUSA) 400-100 mg tablet       zolpidem (AMBIEN) 5 mg tablet Take 1 Tablet by mouth nightly as needed for Sleep. Max Daily Amount: 5 mg. 10 Tablet 0    Suboxone 8-2 mg film sublingaul film       chlordiazePOXIDE (LIBRIUM) 5 mg capsule Take 1 tab PO TID x 2 days then  Take 1 tab PO BID x 2 days then  Take 1 tab PO qday x 2 days then stop (Patient not taking: Reported on 5/20/2021) 12 Cap 0       Past History     Past Medical History:  History reviewed. No pertinent past medical history. Past Surgical History:  History reviewed. No pertinent surgical history. Family History:  History reviewed. No pertinent family history.     Social History:  Social History     Tobacco Use    Smoking status: Current Every Day Smoker     Packs/day: 0.50    Smokeless tobacco: Current User   Vaping Use    Vaping Use: Never used   Substance Use Topics    Alcohol use: Yes     Comment: soc    Drug use: Yes     Types: IV, Marijuana       Allergies:  No Known Allergies      Review of Systems   Review of Systems   Constitutional: Negative for fever. HENT: Positive for dental problem. Gastrointestinal: Negative for vomiting. Hematological: Does not bruise/bleed easily. All other systems reviewed and are negative. Physical Exam   Physical Exam  Vitals and nursing note reviewed. Constitutional:       General: He is not in acute distress. Appearance: Normal appearance. HENT:      Head: Normocephalic and atraumatic. Jaw: There is normal jaw occlusion. Comments: Mild facial asymmetry due to presence of swelling along the left mandible. Nose: Nose normal.      Mouth/Throat:      Comments: Severe dental decay throughout. The patient is tender along the left lower molars. There is no focal fluctuance or drainable abscess appreciated. Sublingual region is soft. Oropharynx is patent and uvula is midline. Eyes:      Extraocular Movements: Extraocular movements intact. Conjunctiva/sclera: Conjunctivae normal.   Neck:      Trachea: Phonation normal.   Pulmonary:      Effort: Pulmonary effort is normal.   Musculoskeletal:         General: Normal range of motion. Cervical back: Normal range of motion and neck supple. Skin:     General: Skin is warm and dry. Neurological:      Mental Status: He is alert and oriented to person, place, and time. GCS: GCS eye subscore is 4. GCS verbal subscore is 5. GCS motor subscore is 6. Psychiatric:         Mood and Affect: Mood normal.         Behavior: Behavior normal.         Diagnostic Study Results     Labs -   No results found for this or any previous visit (from the past 12 hour(s)).     Radiologic Studies -   No orders to display     CT Results  (Last 48 hours)    None        CXR Results  (Last 48 hours)    None            Medical Decision Making   I am the first provider for this patient. I reviewed the vital signs, available nursing notes, past medical history, past surgical history, family history and social history. Vital Signs-Reviewed the patient's vital signs. Patient Vitals for the past 12 hrs:   Temp Pulse Resp BP SpO2   07/19/22 1539 98.6 °F (37 °C) 97 14 118/78 97 %       Records Reviewed: Nursing Notes    Provider Notes (Medical Decision Making):   Dental ball and dose of Augmentin given in the ED. Advised on continued use of antibiotics. I strongly encouraged the patient to call ASAP to secure prompt dental follow-up for definitive management. The patient voices understanding and agreement this plan. ED Course:   Initial assessment performed. The patients presenting problems have been discussed, and they are in agreement with the care plan formulated and outlined with them. I have encouraged them to ask questions as they arise throughout their visit. Critical Care Time: None    Disposition:  dc    PLAN:  1. Discharge Medication List as of 7/19/2022  4:54 PM      START taking these medications    Details   amoxicillin-clavulanate (Augmentin) 875-125 mg per tablet Take 1 Tablet by mouth two (2) times a day for 7 days. , Normal, Disp-13 Tablet, R-0         CONTINUE these medications which have NOT CHANGED    Details   ibuprofen (MOTRIN) 200 mg tablet Take 2 Tablets by mouth every six (6) hours as needed for Pain., Normal, Disp-20 Tablet, R-0      cyclobenzaprine (FLEXERIL) 5 mg tablet Take 1 Tablet by mouth three (3) times daily as needed for Muscle Spasm(s). , Normal, Disp-8 Tablet, R-0      sofosbuvir-velpatasvir (EPCLUSA) 400-100 mg tablet Historical Med      zolpidem (AMBIEN) 5 mg tablet Take 1 Tablet by mouth nightly as needed for Sleep.  Max Daily Amount: 5 mg., Print, Disp-10 Tablet, R-0 Suboxone 8-2 mg film sublingaul film Historical Med, MYKE      chlordiazePOXIDE (LIBRIUM) 5 mg capsule Take 1 tab PO TID x 2 days then  Take 1 tab PO BID x 2 days then  Take 1 tab PO qday x 2 days then stop, Print, Disp-12 Cap, R-0           2. Follow-up Information     Follow up With Specialties Details Why Contact Info    Dental clinic (see list provided)  Call today For follow up     [facility] DEPT Emergency Medicine  As needed, If symptoms worsen [address] [facility]  [phone]        Return to ED if worse     Diagnosis     Clinical Impression:   1. Dental infection    2. Dental decay          Please note that this dictation was completed with [employer], the computer voice recognition software. Quite often unanticipated grammatical, syntax, homophones, and other interpretive errors are inadvertently transcribed by the computer software. Please disregards these errors. Please excuse any errors that have escaped final proofreading.

## 2022-07-19 NOTE — DISCHARGE INSTRUCTIONS
Emergency [address] by EBONI [facility]  [address] [facility], [address] [facility] [address]  Open M, W, F: 8AM - 5PM and T, Th: 8AM-6PM  Phone: [phone], press 4  $70 for Emergency Care  $60 for first routine care, then pay by sliding scale based upon income. [facility]  [address]in Aguilar[address]don Villan[address], [address]  Phone: [phone]    [address] Dentistry  [address] [facility], [address]  Phone: [phone]  Fee: $75 Routine Extraction, $125 Complex Extraction  Open Monday - Friday 8AM - 5:00PM    [facility]  [address], [address]  Open Monday - Friday 8AM - 4:30 PM  Phone: [phone] of Dentistry Urgent [address] [place] [address] Dentistry, [address] [facility] [address], 1st Floor  First Come First Service starting at 8:30 AM M-F  Phone: [phone], press 2  Fee: $150 per tooth (x-ray & extractions only)  Pediatrics Phone[de-identified] [phone], 8-5 M-F    [address] Dentistry, [address] [place] [address], 2nd Floor, [address] [place] [address] starting at 8:30 AM - 3 PM [address] [place] [address], [address] [place] [address]  Phone: [phone] or [phone]  Emergency Hours: 9:30AM - 11AM (extractions)  Simple tooth extraction $ per tooth. #75 for x-ray    [facility] Residents only, over the age of 25  Phone: [phone]. Leave message saying you need an appointment to register.   Hours: Tuesday Evenings

## 2022-07-19 NOTE — ED NOTES
Emergency Department Nursing Plan of Care       The Nursing Plan of Care is developed from the Nursing assessment and Emergency Department Attending provider initial evaluation. The plan of care may be reviewed in the ED Provider note.     The Plan of Care was developed with the following considerations:   Patient / Family readiness to learn indicated by:verbalized understanding  Persons(s) to be included in education: patient  Barriers to Learning/Limitations:No    [address], RN    7/19/2022   4:30 PM

## 2022-07-19 NOTE — ED TRIAGE NOTES
Left lower dental pain and swelling x yesterday. Pt denies breaking tooth recently but states he just has bad teeth. OTC medications without relief.

## 2022-08-21 ENCOUNTER — HOSPITAL ENCOUNTER (EMERGENCY)
Age: 42
Discharge: HOME | End: 2022-08-21
Attending: EMERGENCY MEDICINE
Payer: MEDICAID

## 2022-08-21 VITALS
SYSTOLIC BLOOD PRESSURE: 126 MMHG | WEIGHT: 200 LBS | OXYGEN SATURATION: 96 % | RESPIRATION RATE: 18 BRPM | BODY MASS INDEX: 29.62 KG/M2 | TEMPERATURE: 98 F | HEART RATE: 86 BPM | DIASTOLIC BLOOD PRESSURE: 78 MMHG | HEIGHT: 69 IN

## 2022-08-21 DIAGNOSIS — M54.41 ACUTE MIDLINE LOW BACK PAIN WITH RIGHT-SIDED SCIATICA: Primary | ICD-10-CM

## 2022-08-21 PROCEDURE — 99283 EMERGENCY DEPT VISIT LOW MDM: CPT

## 2022-08-21 RX ORDER — LIDOCAINE 50 MG/G
PATCH TOPICAL
Qty: 1 EACH | Refills: 0 | Status: SHIPPED | OUTPATIENT
Start: 2022-08-21 | End: 2022-08-21

## 2022-08-21 RX ORDER — PREDNISONE 5 MG/1
TABLET ORAL
Qty: 21 TABLET | Refills: 0 | Status: SHIPPED | OUTPATIENT
Start: 2022-08-21

## 2022-08-21 RX ORDER — CYCLOBENZAPRINE HCL 10 MG
10 TABLET ORAL
Qty: 12 TABLET | Refills: 0 | Status: SHIPPED | OUTPATIENT
Start: 2022-08-21

## 2022-08-21 RX ORDER — LIDOCAINE 50 MG/G
PATCH TOPICAL
Qty: 5 EACH | Refills: 0 | Status: SHIPPED | OUTPATIENT
Start: 2022-08-21

## 2022-08-21 NOTE — ED PROVIDER NOTES
EMERGENCY DEPARTMENT HISTORY AND PHYSICAL EXAM          Date: 8/21/2022  Patient Name: Phillip Wall    History of Presenting Illness     Chief Complaint   Patient presents with    Back Pain         History Provided By: Patient    HPI: Phillip Wall is a 38 y.o. male with a PMH of insomnia, chronic hep C, drug abuse and dependence, sociopathic behavior who presents with lower back pain radiating to the right hip x2 days. Patient denies any injury or trauma. He rates discomfort 7 out of 10 and aching in nature. There are no alleviating or exacerbating factors reported. He denies any paresthesias, bowel or bladder incontinence. PCP: Jose F Simon MD    Current Outpatient Medications   Medication Sig Dispense Refill    predniSONE (STERAPRED) 5 mg dose pack See administration instruction per 5mg dose pack 21 Tablet 0    cyclobenzaprine (FLEXERIL) 10 mg tablet Take 1 Tablet by mouth three (3) times daily as needed for Muscle Spasm(s). 12 Tablet 0    lidocaine (LIDODERM) 5 % Apply patch to the affected area for 12 hours a day and remove for 12 hours a day. 5 Each 0    ibuprofen (MOTRIN) 200 mg tablet Take 2 Tablets by mouth every six (6) hours as needed for Pain. 20 Tablet 0    sofosbuvir-velpatasvir (EPCLUSA) 400-100 mg tablet       zolpidem (AMBIEN) 5 mg tablet Take 1 Tablet by mouth nightly as needed for Sleep. Max Daily Amount: 5 mg. 10 Tablet 0    Suboxone 8-2 mg film sublingaul film          Past History     Past Medical History:  History reviewed. No pertinent past medical history. Past Surgical History:  History reviewed. No pertinent surgical history. Family History:  History reviewed. No pertinent family history.     Social History:  Social History     Tobacco Use    Smoking status: Every Day     Packs/day: 0.50     Types: Cigarettes    Smokeless tobacco: Current   Vaping Use    Vaping Use: Never used   Substance Use Topics    Alcohol use: Yes     Comment: soc    Drug use: Yes     Types: IV, Marijuana Allergies:  No Known Allergies      Review of Systems   Review of Systems   Constitutional:  Negative for chills and fever. Gastrointestinal:  Negative for nausea and vomiting. Musculoskeletal:  Positive for arthralgias and back pain. Negative for gait problem and joint swelling. Skin: Negative. Allergic/Immunologic: Negative for immunocompromised state. Neurological:  Negative for speech difficulty and weakness. Psychiatric/Behavioral:  Negative for self-injury. All other systems reviewed and are negative. Physical Exam     Vitals:    08/21/22 1515   BP: 126/78   Pulse: 86   Resp: 18   Temp: 98 °F (36.7 °C)   SpO2: 96%   Weight: 90.7 kg (200 lb)   Height: 5' 9\" (1.753 m)     Physical Exam  Vitals and nursing note reviewed. Constitutional:       General: He is not in acute distress. Appearance: He is well-developed. HENT:      Head: Normocephalic and atraumatic. Mouth/Throat:      Pharynx: No oropharyngeal exudate. Eyes:      Conjunctiva/sclera: Conjunctivae normal.   Cardiovascular:      Rate and Rhythm: Normal rate and regular rhythm. Heart sounds: Normal heart sounds. Pulmonary:      Effort: Pulmonary effort is normal. No respiratory distress. Breath sounds: Normal breath sounds. No wheezing or rales. Musculoskeletal:         General: Normal range of motion. Lumbar back: Tenderness present. Normal range of motion. Negative right straight leg raise test and negative left straight leg raise test.        Back:       Right hip: Tenderness present. No deformity. Normal range of motion. Legs:    Skin:     General: Skin is warm and dry. Neurological:      Mental Status: He is alert and oriented to person, place, and time. Diagnostic Study Results     Labs -   No results found for this or any previous visit (from the past 12 hour(s)).     Radiologic Studies -   No orders to display     CT Results  (Last 48 hours)      None          CXR Results (Last 48 hours)      None              Medical Decision Making   I am the first provider for this patient. I reviewed the vital signs, available nursing notes, past medical history, past surgical history, family history and social history. Vital Signs-Reviewed the patient's vital signs. Records Reviewed: Nursing Notes and Old Medical Records    Provider Notes (Medical Decision Making):   Patient presents with lower back pain x2 days without any direct injury or trauma. He also reports radiation of pain to the right hip. He denies any paresthesias, bowel or bladder incontinence. Physical exam is essentially benign with some tenderness along the lumbar muscles but negative SLR bilaterally. Still suspect some component of sciatica so we will treat with prednisone and muscle relaxants. Patient also requested lidocaine patches for pain relief. Disposition:  Discharged    DISCHARGE NOTE:   4:19 PM        Care plan outlined and precautions discussed. Patient has no new complaints, changes, or physical findings. All medications were reviewed with the patient; will d/c home. All of pt's questions and concerns were addressed. Patient was instructed and agrees to follow up with PCP as needed, as well as to return to the ED upon further deterioration. Patient is ready to go home. Follow-up Information       Follow up With Specialties Details Why Contact Info    Siddhartha Goetz MD Internal Medicine Physician In 1 week As needed [place] [address]  [place] [address]  [phone]              Current Discharge Medication List        START taking these medications    Details   predniSONE (STERAPRED) 5 mg dose pack See administration instruction per 5mg dose pack  Qty: 21 Tablet, Refills: 0  Start date: 8/21/2022      cyclobenzaprine (FLEXERIL) 10 mg tablet Take 1 Tablet by mouth three (3) times daily as needed for Muscle Spasm(s).   Qty: 12 Tablet, Refills: 0  Start date: 8/21/2022 lidocaine (LIDODERM) 5 % Apply patch to the affected area for 12 hours a day and remove for 12 hours a day. Qty: 5 Each, Refills: 0  Start date: 8/21/2022             Procedures:  Procedures    Please note that this dictation was completed with Dragon, computer voice recognition software. Quite often unanticipated grammatical, syntax, homophones, and other interpretive errors are inadvertently transcribed by the computer software. Please disregard these errors. Additionally, please excuse any errors that have escaped final proofreading. Diagnosis     Clinical Impression:   1.  Acute midline low back pain with right-sided sciatica

## 2022-08-21 NOTE — ED NOTES
Pt discharged from ED ambulatory with steady gait. Pt given instructions, medications sent to pharmacy. Pt awake, alert, pink, warm and dry.

## 2022-12-04 ENCOUNTER — HOSPITAL ENCOUNTER (EMERGENCY)
Age: 42
Discharge: HOME | End: 2022-12-04
Attending: EMERGENCY MEDICINE
Payer: MEDICAID

## 2022-12-04 VITALS
DIASTOLIC BLOOD PRESSURE: 86 MMHG | TEMPERATURE: 98 F | HEIGHT: 69 IN | HEART RATE: 96 BPM | WEIGHT: 200 LBS | SYSTOLIC BLOOD PRESSURE: 151 MMHG | RESPIRATION RATE: 18 BRPM | OXYGEN SATURATION: 94 % | BODY MASS INDEX: 29.62 KG/M2

## 2022-12-04 DIAGNOSIS — J06.9 VIRAL URI WITH COUGH: Primary | ICD-10-CM

## 2022-12-04 PROCEDURE — 99283 EMERGENCY DEPT VISIT LOW MDM: CPT

## 2022-12-04 RX ORDER — ARIPIPRAZOLE 10 MG/1
10 TABLET ORAL DAILY
COMMUNITY

## 2022-12-04 RX ORDER — BUPROPION HYDROCHLORIDE 100 MG/1
100 TABLET, EXTENDED RELEASE ORAL 2 TIMES DAILY
COMMUNITY

## 2022-12-04 RX ORDER — LORATADINE 10 MG/1
10 TABLET ORAL DAILY
Qty: 20 TABLET | Refills: 0 | Status: SHIPPED | OUTPATIENT
Start: 2022-12-04

## 2022-12-04 RX ORDER — QUETIAPINE FUMARATE 300 MG/1
300 TABLET, FILM COATED ORAL DAILY
COMMUNITY

## 2022-12-04 RX ORDER — DIVALPROEX SODIUM 500 MG/1
500 TABLET, EXTENDED RELEASE ORAL 2 TIMES DAILY
COMMUNITY

## 2022-12-04 RX ORDER — DEXTROMETHORPHAN HYDROBROMIDE, GUAIFENESIN 20; 400 MG/20ML; MG/20ML
SOLUTION ORAL
Qty: 118 ML | Refills: 0 | Status: SHIPPED | OUTPATIENT
Start: 2022-12-04

## 2022-12-04 NOTE — Clinical Note
[facility] EMERGENCY DEPT  [address] [address] [phone]    Work/School Note    Date: 12/4/2022    To Whom It May concern:    Rebeca Mullen was seen and treated today in the emergency room by the following provider(s):  Attending Provider: Nena Jay MD  Nurse Practitioner: Radha Parker NP.      Rebeca Mullen is excused from work/school on 12/04/22 and 12/05/22. He is medically clear to return to work/school on 12/6/2022.        Sincerely,          Brittanie Mendenhall NP

## 2022-12-04 NOTE — Clinical Note
[facility] EMERGENCY DEPT  [address] [address] [phone]    Work/School Note    Date: 12/4/2022    To Whom It May concern:    Rebeca Mullen was seen and treated today in the emergency room by the following provider(s):  Attending Provider: Nena Jay MD  Nurse Practitioner: Radha Parker NP.      Rebeca Mullen is excused from work/school on 12/04/22 and 12/05/22. He is medically clear to return to work/school on 12/6/2022.        Sincerely,          Brittanie Mendenhall NP

## 2022-12-04 NOTE — LETTER
[facility] EMERGENCY DEPT  [address] [address] [phone]    Work/School Note    Date: 12/4/2022    To Whom It May concern:    Jose Francisco Hathaway was seen and treated today in the emergency room by the following provider(s):  Attending Provider: Lucio Anthony MD  Nurse Practitioner: Pepper Contreras NP.      Jose Francisco Hathaway may return to work on 12-7-22.     Sincerely,          Marella Curling, NP

## 2022-12-04 NOTE — Clinical Note
[facility] EMERGENCY DEPT  [address] [address] [phone]    Work/School Note    Date: 12/4/2022    To Whom It May concern:    Demian Carias was seen and treated today in the emergency room by the following provider(s):  Attending Provider: Eloina Tinsley MD  Nurse Practitioner: Marya Ybarra NP.      Demian Carias is excused from work/school on 12/04/22 and 12/05/22. He is medically clear to return to work/school on 12/6/2022.        Sincerely,          Edgardo Sagastume NP

## 2022-12-04 NOTE — ED PROVIDER NOTES
EMERGENCY DEPARTMENT HISTORY AND PHYSICAL EXAM          Date: 12/4/2022  Patient Name: Derrek Brown    History of Presenting Illness     Chief Complaint   Patient presents with    Sinus Infection     Patient presents to ED with c/o  concerns for sinus infection for 1 day         History Provided By: Patient    Chief Complaint: nasal congestion  Duration: 2 days   Timing:  Acute  Location: nasal  Quality:  clear drainage  Severity: Moderate  Modifying Factors: none  Associated Symptoms:  vough      HPI: Derrek Brown is a 38 y.o. male with a PMH of No significant past medical history who presents with nasal congestion onset 2 days ago. Patient reports clear nasal drainage. Patient states he has a history of sinus infections. Patient also reports loose cough. Denies fever. PCP: Rasta Raman MD    Current Outpatient Medications   Medication Sig Dispense Refill    loratadine (Claritin) 10 mg tablet Take 1 Tablet by mouth daily. 20 Tablet 0    dextromethorphan-guaiFENesin (Robitussin Cough-Chest Catrahcito DM) 5-100 mg/5 mL liqd 10 ml every 6 hours as needed for cough 118 mL 0    buPROPion SR (Wellbutrin SR) 100 mg SR tablet Take 100 mg by mouth two (2) times a day. divalproex ER (Depakote ER) 500 mg ER tablet Take 500 mg by mouth two (2) times a day. ARIPiprazole (Abilify) 10 mg tablet Take 10 mg by mouth daily. QUEtiapine (SEROqueL) 300 mg tablet Take 300 mg by mouth daily. predniSONE (STERAPRED) 5 mg dose pack See administration instruction per 5mg dose pack (Patient not taking: Reported on 12/4/2022) 21 Tablet 0    cyclobenzaprine (FLEXERIL) 10 mg tablet Take 1 Tablet by mouth three (3) times daily as needed for Muscle Spasm(s). (Patient not taking: Reported on 12/4/2022) 12 Tablet 0    lidocaine (LIDODERM) 5 % Apply patch to the affected area for 12 hours a day and remove for 12 hours a day.  (Patient not taking: Reported on 12/4/2022) 5 Each 0    ibuprofen (MOTRIN) 200 mg tablet Take 2 Tablets by mouth every six (6) hours as needed for Pain. (Patient not taking: Reported on 12/4/2022) 20 Tablet 0    sofosbuvir-velpatasvir (EPCLUSA) 400-100 mg tablet  (Patient not taking: Reported on 12/4/2022)      zolpidem (AMBIEN) 5 mg tablet Take 1 Tablet by mouth nightly as needed for Sleep. Max Daily Amount: 5 mg. (Patient not taking: Reported on 12/4/2022) 10 Tablet 0    Suboxone 8-2 mg film sublingaul film          Past History     Past Medical History:  Past Medical History:   Diagnosis Date    Infectious disease 2016    Hep C       Past Surgical History:  History reviewed. No pertinent surgical history. Family History:  History reviewed. No pertinent family history. Social History:  Social History     Tobacco Use    Smoking status: Every Day     Packs/day: 0.50     Types: Cigarettes    Smokeless tobacco: Current   Vaping Use    Vaping Use: Never used   Substance Use Topics    Alcohol use: Yes     Comment: soc    Drug use: Yes     Types: Marijuana       Allergies:  No Known Allergies      Review of Systems   Review of Systems   Constitutional:  Negative for chills, fatigue and fever. HENT:  Positive for congestion. Negative for sore throat. Eyes:  Negative for redness. Respiratory:  Positive for cough. Negative for chest tightness and wheezing. Cardiovascular:  Negative for chest pain. Gastrointestinal:  Negative for abdominal pain. Genitourinary:  Negative for dysuria. Musculoskeletal:  Negative for arthralgias, back pain, myalgias, neck pain and neck stiffness. Skin:  Negative for rash. Neurological:  Negative for dizziness, syncope, weakness, light-headedness, numbness and headaches. Hematological:  Negative for adenopathy. Psychiatric/Behavioral:  Negative for agitation and behavioral problems. All other systems reviewed and are negative.     Physical Exam     Vitals:    12/04/22 1611   BP: (!) 151/86   Pulse: 96   Resp: 18   Temp: 98 °F (36.7 °C)   SpO2: 94%   Weight: 90.7 kg (200 lb)   Height: 5' 9\" (1.753 m)     Physical Exam  Vitals and nursing note reviewed. Constitutional:       Appearance: Normal appearance. He is well-developed. HENT:      Head: Normocephalic and atraumatic. Right Ear: Tympanic membrane, ear canal and external ear normal.      Left Ear: Tympanic membrane, ear canal and external ear normal.      Nose: Nose normal.      Mouth/Throat:      Mouth: Mucous membranes are moist.   Eyes:      General:         Right eye: No discharge. Left eye: No discharge. Conjunctiva/sclera: Conjunctivae normal.   Cardiovascular:      Rate and Rhythm: Normal rate and regular rhythm. Heart sounds: Normal heart sounds. Pulmonary:      Effort: Pulmonary effort is normal. No respiratory distress. Breath sounds: Normal breath sounds. No wheezing. Abdominal:      General: Bowel sounds are normal.      Palpations: Abdomen is soft. Tenderness: There is no abdominal tenderness. Musculoskeletal:         General: Normal range of motion. Cervical back: Normal range of motion and neck supple. Lymphadenopathy:      Cervical: No cervical adenopathy. Skin:     General: Skin is warm and dry. Neurological:      Mental Status: He is alert and oriented to person, place, and time. Cranial Nerves: No cranial nerve deficit. Psychiatric:         Behavior: Behavior normal.         Thought Content: Thought content normal.         Judgment: Judgment normal.             Medical Decision Making   I am the first provider for this patient. I reviewed the vital signs, available nursing notes, past medical history, past surgical history, family history and social history. Vital Signs-Reviewed the patient's vital signs. Records Reviewed: Nursing Notes    Provider Notes (Medical Decision Making):    Who presents with upper respiratory symptoms runny nose cough most likely viral will not prescribe although considered antibiotic only 2 days of symptoms Afrin Claritin Robitussin for cough increase fluids             Procedures:  Procedures    Diagnostic Study Results     Labs -   No results found for this or any previous visit (from the past 12 hour(s)). Radiologic Studies -   No orders to display     CT Results  (Last 48 hours)      None          CXR Results  (Last 48 hours)      None                Disposition:  home    DISCHARGE NOTE:         Care plan outlined and precautions discussed. Patient has no new complaints, changes, or physical findings. Results of    were reviewed with the patient. All medications were reviewed with the patient; will d/c home with robitussin dm claritin afrin. All of pt's questions and concerns were addressed. Patient was instructed and agrees to follow up with PCP, as well as to return to the ED upon further deterioration. Patient is ready to go home. Follow-up Information       Follow up With Specialties Details Why Contact Info    Mikala Lea MD Internal Medicine Physician In 1 week  [address] [place] [address]  [phone]              Current Discharge Medication List        START taking these medications    Details   loratadine (Claritin) 10 mg tablet Take 1 Tablet by mouth daily. Qty: 20 Tablet, Refills: 0  Start date: 12/4/2022      dextromethorphan-guaiFENesin (Robitussin Cough-Chest Catrachito DM) 5-100 mg/5 mL liqd 10 ml every 6 hours as needed for cough  Qty: 118 mL, Refills: 0  Start date: 12/4/2022               Please note that this dictation was completed with Dragon, computer voice recognition software. Quite often unanticipated grammatical, syntax, homophones, and other interpretive errors are inadvertently transcribed by the computer software. Please disregard these errors. Additionally, please excuse any errors that have escaped final proofreading. Diagnosis     Clinical Impression:   1.  Viral URI with cough

## 2023-06-29 NOTE — ED NOTES
Pt endorses lower back pain that radiates down to right hip since yesterday. Pt denies tingling, injury and trauma. Emergency Department Nursing Plan of Care       The Nursing Plan of Care is developed from the Nursing assessment and Emergency Department Attending provider initial evaluation. The plan of care may be reviewed in the ED Provider note.     The Plan of Care was developed with the following considerations:   Patient / Family readiness to learn indicated by:verbalized understanding  Persons(s) to be included in education: patient  Barriers to Learning/Limitations:No    Signed   Edgardo Acevedo RN    1/28/2018   1:31 AM    . Destruction After The Procedure: No